# Patient Record
Sex: FEMALE | URBAN - METROPOLITAN AREA
[De-identification: names, ages, dates, MRNs, and addresses within clinical notes are randomized per-mention and may not be internally consistent; named-entity substitution may affect disease eponyms.]

---
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
## 2024-10-10 ENCOUNTER — NEW PATIENT COMPREHENSIVE (OUTPATIENT)
Dept: URBAN - METROPOLITAN AREA CLINIC 59 | Facility: CLINIC | Age: 24
End: 2024-10-10

## 2024-10-10 DIAGNOSIS — H04.123: ICD-10-CM

## 2024-10-10 DIAGNOSIS — Z01.00: ICD-10-CM

## 2024-10-10 DIAGNOSIS — H52.13: ICD-10-CM

## 2024-10-10 PROCEDURE — 92015 DETERMINE REFRACTIVE STATE: CPT

## 2024-10-10 PROCEDURE — 92004 COMPRE OPH EXAM NEW PT 1/>: CPT

## 2024-10-10 ASSESSMENT — VISUAL ACUITY
OD_PH: 20/25
OS_SC: 20/50+2
OS_PH: 20/20
OD_SC: 20/60+2